# Patient Record
Sex: FEMALE | Race: BLACK OR AFRICAN AMERICAN | NOT HISPANIC OR LATINO | Employment: UNEMPLOYED | URBAN - METROPOLITAN AREA
[De-identification: names, ages, dates, MRNs, and addresses within clinical notes are randomized per-mention and may not be internally consistent; named-entity substitution may affect disease eponyms.]

---

## 2021-11-23 ENCOUNTER — HOSPITAL ENCOUNTER (EMERGENCY)
Facility: HOSPITAL | Age: 21
Discharge: HOME | End: 2021-11-23
Admitting: EMERGENCY MEDICINE
Payer: COMMERCIAL

## 2021-11-23 VITALS
DIASTOLIC BLOOD PRESSURE: 83 MMHG | HEIGHT: 66 IN | HEART RATE: 84 BPM | TEMPERATURE: 97.8 F | RESPIRATION RATE: 16 BRPM | SYSTOLIC BLOOD PRESSURE: 144 MMHG | OXYGEN SATURATION: 98 % | BODY MASS INDEX: 32.14 KG/M2 | WEIGHT: 200 LBS

## 2021-11-23 DIAGNOSIS — J06.9 VIRAL URI: ICD-10-CM

## 2021-11-23 DIAGNOSIS — R10.84 ABDOMINAL PAIN, GENERALIZED: ICD-10-CM

## 2021-11-23 LAB
FLUAV RNA SPEC QL NAA+PROBE: NEGATIVE
FLUBV RNA RESP QL NAA+PROBE: NEGATIVE
MONOCYTES NFR BLD AUTO: NEGATIVE %
SARS-COV-2 RNA RESP QL NAA+PROBE: NEGATIVE

## 2021-11-23 PROCEDURE — C9803 HOPD COVID-19 SPEC COLLECT: HCPCS

## 2021-11-23 PROCEDURE — 87636 SARSCOV2 & INF A&B AMP PRB: CPT | Performed by: EMERGENCY MEDICINE

## 2021-11-23 PROCEDURE — 99283 EMERGENCY DEPT VISIT LOW MDM: CPT

## 2021-11-23 PROCEDURE — 86308 HETEROPHILE ANTIBODY SCREEN: CPT | Performed by: EMERGENCY MEDICINE

## 2021-11-23 PROCEDURE — 36415 COLL VENOUS BLD VENIPUNCTURE: CPT | Performed by: EMERGENCY MEDICINE

## 2021-11-23 PROCEDURE — 250N000013 HC RX MED GY IP 250 OP 250 PS 637: Performed by: EMERGENCY MEDICINE

## 2021-11-23 RX ORDER — SUCRALFATE ORAL 1 G/10ML
1 SUSPENSION ORAL 4 TIMES DAILY
Qty: 420 ML | Refills: 0 | Status: SHIPPED | OUTPATIENT
Start: 2021-11-23

## 2021-11-23 RX ORDER — ACETAMINOPHEN 325 MG/1
975 TABLET ORAL ONCE
Status: COMPLETED | OUTPATIENT
Start: 2021-11-23 | End: 2021-11-23

## 2021-11-23 RX ADMIN — ACETAMINOPHEN 975 MG: 325 TABLET ORAL at 21:45

## 2021-11-23 ASSESSMENT — MIFFLIN-ST. JEOR: SCORE: 1678.94

## 2021-11-23 ASSESSMENT — ENCOUNTER SYMPTOMS
ABDOMINAL PAIN: 1
COUGH: 1
SORE THROAT: 1
DIARRHEA: 0
HEMATURIA: 0
VOMITING: 0
CHILLS: 1
NECK PAIN: 1
HEADACHES: 1
SHORTNESS OF BREATH: 0
NAUSEA: 0
FEVER: 0
DYSURIA: 0

## 2021-11-24 NOTE — ED NOTES
Pt reports ongoing sore throat mild abdominal discomfort around the umbilicus, and bilateral ear pain x5 days. Pt also reports increased fatigue and sleeping as well as decreased appetite. Denies fevers, endorses chills, denies NVD, Denies LOC, Denies SOB, Denies cough, denies CP. No other complaints.

## 2021-11-24 NOTE — DISCHARGE INSTRUCTIONS
You were seen in the emergency department today for evaluation of multiple symptoms.  Your Covid and mono screen were both negative.    Take Tylenol 1000 mg every 6-8 hours for pain.  Try taking a decongestant like Sudafed to help with your ear discomfort.  Continue taking your pantoprazole as prescribed. I will prescribe you a medicine called carafate you can take 4x daily before meals to help with the stomach pain.     Follow-up with your primary care provider as soon as possible.  Return to the ER with any new or worsening symptoms including severe pain, difficulty breathing, persistent vomiting, chest pain, or any other symptoms that concern you.

## 2021-11-24 NOTE — ED PROVIDER NOTES
EMERGENCY DEPARTMENT ENCOUNTER      NAME: Servando Masterson  AGE: 21 year old female  YOB: 2000  MRN: [id number]  EVALUATION DATE & TIME: 11/23/2021  8:53 PM    PCP: No Ref-Primary, Physician    ED PROVIDER: Roz Santillan PA-C      No chief complaint on file.        FINAL IMPRESSION:  1. Viral URI    2. Abdominal pain, generalized          ED COURSE & MEDICAL DECISION MAKING:    Pertinent Labs & Imaging studies reviewed. (See chart for details)    21 year old female presents to the Emergency Department for evaluation of multiple complaints.    Physical exam is remarkable for a generally well-appearing female who is in no acute distress.  Heart and lung sounds are clear diffusely throughout.  Abdomen soft and nontender.  TMs unremarkable bilaterally, oropharynx unremarkable.  No significant lymphadenopathy noted on exam.  No meningismus or nuchal rigidity.  Vital signs are stable and she is afebrile.    Covid/influenza test negative.  Mono screen is negative.    Patient was given Tylenol for treatment of her pain.  I discussed further evaluation for abdominal pain with her including screening labs, she declined this and would prefer to follow-up with her primary care provider which I think is reasonable given her benign abdominal exam.  I do not see any evidence of otitis media or strep pharyngitis on my exam, I suspect her upper respiratory symptoms are secondary to a viral URI.  Recommend comfort care at home with Tylenol for pain, also recommend over-the-counter decongestant.  Patient states that her stomach pain feels similar to her ulcer related pain in the past so I provided her with a prescription for Carafate.  Advised follow-up with her primary care provider as soon as possible, recommend return to the ED with any new or worsening symptoms.  The patient is agreeable with this treatment plan and verbalized her understanding.    ED Course   9:30 PM Performed my initial history and physical  exam. Discussed workup in the emergency department, management of symptoms, and likely disposition.   10:15 PM Updated patient with test results. I discussed the plan for discharge with the patient, and patient is agreeable. We discussed supportive cares at home and reasons for return to the ER including new or worsening symptoms - all questions and concerns addressed. Patient to be discharged by RN.    At the conclusion of the encounter I discussed the results of all of the tests and the disposition. The questions were answered. The patient or family acknowledged understanding and was agreeable with the care plan.     Voice recognition software was used in the creation of this note. Any grammatical or nonsensical errors are due to inherent errors with the software and are not the intention of the writer.     MEDICATIONS GIVEN IN THE EMERGENCY:  Medications   acetaminophen (TYLENOL) tablet 975 mg (975 mg Oral Given 11/23/21 2145)       NEW PRESCRIPTIONS STARTED AT TODAY'S ER VISIT  New Prescriptions    SUCRALFATE (CARAFATE) 1 GM/10ML SUSPENSION    Take 10 mLs (1 g) by mouth 4 times daily            =================================================================    HPI    Patient information was obtained from: Patient    Use of Intrepreter: N/A         Servando Masterson is a 21 year old female who presents to the emergency department for evaluation of multiple complaints.    The patient reports that for the last week, she has been experiencing a headache, anterior and lateral neck pain bilaterally, and a sore throat.  She states while she was here in the emergency department, she developed abdominal pain as well which is sharp and intermittent in nature.  She endorses chills but has not measured a fever.  She has been taking Tylenol without improvement.  She also endorses a mild cough and nasal congestion.  No change in taste or smell.  No known COVID-19 exposures, she is not vaccinated.  She smokes 1 pack/day of  cigarettes.  She denies any nausea, vomiting, diarrhea, black or bloody stools, chest pain, or difficulty breathing.      REVIEW OF SYSTEMS   Review of Systems   Constitutional: Positive for chills. Negative for fever.   HENT: Positive for congestion and sore throat.    Respiratory: Positive for cough. Negative for shortness of breath.    Cardiovascular: Negative for chest pain.   Gastrointestinal: Positive for abdominal pain. Negative for diarrhea, nausea and vomiting.   Genitourinary: Negative for dysuria and hematuria.   Musculoskeletal: Positive for neck pain.   Neurological: Positive for headaches.     All other systems reviewed and are negative unless noted in HPI.    PAST MEDICAL HISTORY:  No past medical history on file.    PAST SURGICAL HISTORY:  Past Surgical History:   Procedure Laterality Date     ENT SURGERY      tonsils and adenoids removed       CURRENT MEDICATIONS:    sucralfate (CARAFATE) 1 GM/10ML suspension  clindamycin (CLINDAGEL) 1 % gel  Fexofenadine HCl (ALLEGRA PO)  Ibuprofen (MIDOL CRAMP FORMULA MAX ST PO)  naproxen (NAPROSYN) 500 MG tablet        ALLERGIES:  Allergies   Allergen Reactions     Pcn [Penicillins]      Sulfa Drugs        FAMILY HISTORY:  No family history on file.    SOCIAL HISTORY:   Social History     Socioeconomic History     Marital status: Single     Spouse name: Not on file     Number of children: Not on file     Years of education: Not on file     Highest education level: Not on file   Occupational History     Not on file   Tobacco Use     Smoking status: Current Every Day Smoker     Packs/day: 0.25     Smokeless tobacco: Never Used   Substance and Sexual Activity     Alcohol use: No     Drug use: No     Sexual activity: Yes     Partners: Female   Other Topics Concern     Not on file   Social History Narrative     Not on file     Social Determinants of Health     Financial Resource Strain: Not on file   Food Insecurity: Not on file   Transportation Needs: Not on file  "  Physical Activity: Not on file   Stress: Not on file   Social Connections: Not on file   Intimate Partner Violence: Not on file   Housing Stability: Not on file       VITALS:  Patient Vitals for the past 24 hrs:   BP Temp Pulse Resp SpO2 Height Weight   11/23/21 1829 (!) 144/83 97.8  F (36.6  C) 84 16 98 % 1.676 m (5' 6\") 90.7 kg (200 lb)       PHYSICAL EXAM    VITAL SIGNS: BP (!) 144/83   Pulse 84   Temp 97.8  F (36.6  C)   Resp 16   Ht 1.676 m (5' 6\")   Wt 90.7 kg (200 lb)   SpO2 98%   BMI 32.28 kg/m    General Appearance: Alert, cooperative, normal speech and facial symmetry, appears stated age, the patient does not appear in distress  Head:  Normocephalic, without obvious abnormality, atraumatic  Eyes: Conjunctiva/corneas clear, EOM's intact, no nystagmus, PERRL  ENT:  TMs unremarkable bilaterally with no erythema or effusion; Lips, mucosa, and tongue normal; teeth and gums normal, no pharyngeal inflammation, no dysphonia or difficulty swallowing, membranes are moist without pallor  Neck:  Supple, symmetrical, trachea midline, no adenopathy; no midline tenderness or stepoffs; no nuchal rigidity  Cardio:  Regular rate and rhythm, S1 and S2 normal, no murmur, rub    or gallop, 2+ pulses symmetric in all extremities  Pulm:  Clear to auscultation bilaterally, respirations unlabored with no accessory muscle use  Abdomen:  Abdomen is soft, non-distended with no tenderness to palpation, rebound tenderness, or guarding.   Extremities:  Moves all extremities  Neuro: Patient is awake, alert, and responsive to voice. No gross motor weaknesses or sensory loss; moves all extremities.     LAB:  All pertinent labs reviewed and interpreted.  Labs Ordered and Resulted from Time of ED Arrival to Time of ED Departure   INFLUENZA A/B & SARS-COV2 PCR MULTIPLEX - Normal       Result Value    Influenza A PCR Negative      Influenza B PCR Negative      SARS CoV2 PCR Negative     MONONUCLEOSIS SCREEN - Normal    Mononucleosis " Screen Negative         RADIOLOGY:  Reviewed all pertinent imaging. Please see official radiology report.  No orders to display       Roz Santillan PA-C  Emergency Medicine  [facility] EMERGENCY DEPARTMENT  [facility] [facility] [address] [phone]  Dept: [phone]       Roz Santillan PA-C  11/23/21 6424

## 2023-12-28 ENCOUNTER — APPOINTMENT (OUTPATIENT)
Dept: RADIOLOGY | Facility: HOSPITAL | Age: 23
End: 2023-12-28
Attending: EMERGENCY MEDICINE

## 2023-12-28 ENCOUNTER — HOSPITAL ENCOUNTER (EMERGENCY)
Facility: HOSPITAL | Age: 23
Discharge: HOME | End: 2023-12-28
Attending: EMERGENCY MEDICINE | Admitting: EMERGENCY MEDICINE

## 2023-12-28 VITALS
SYSTOLIC BLOOD PRESSURE: 116 MMHG | HEART RATE: 82 BPM | DIASTOLIC BLOOD PRESSURE: 71 MMHG | OXYGEN SATURATION: 100 % | TEMPERATURE: 98.7 F | RESPIRATION RATE: 18 BRPM | WEIGHT: 200 LBS | BODY MASS INDEX: 32.14 KG/M2 | HEIGHT: 66 IN

## 2023-12-28 DIAGNOSIS — H92.03 OTALGIA, BILATERAL: ICD-10-CM

## 2023-12-28 DIAGNOSIS — J06.9 UPPER RESPIRATORY TRACT INFECTION, UNSPECIFIED TYPE: ICD-10-CM

## 2023-12-28 LAB
FLUAV RNA SPEC QL NAA+PROBE: NEGATIVE
FLUBV RNA RESP QL NAA+PROBE: NEGATIVE
RSV RNA SPEC NAA+PROBE: NEGATIVE
SARS-COV-2 RNA RESP QL NAA+PROBE: NEGATIVE

## 2023-12-28 PROCEDURE — 87637 SARSCOV2&INF A&B&RSV AMP PRB: CPT | Performed by: EMERGENCY MEDICINE

## 2023-12-28 PROCEDURE — 71046 X-RAY EXAM CHEST 2 VIEWS: CPT

## 2023-12-28 PROCEDURE — 99284 EMERGENCY DEPT VISIT MOD MDM: CPT | Mod: 25

## 2023-12-28 RX ORDER — ALBUTEROL SULFATE 90 UG/1
2 AEROSOL, METERED RESPIRATORY (INHALATION) EVERY 6 HOURS PRN
Qty: 18 G | Refills: 0 | Status: SHIPPED | OUTPATIENT
Start: 2023-12-28

## 2023-12-28 NOTE — ED TRIAGE NOTES
Pt has had r ear pain x2 weeks.     Also took  covid test and it was positive, wants to be tested.

## 2023-12-29 NOTE — ED PROVIDER NOTES
EMERGENCY DEPARTMENT ENCOUNTER      NAME: Servando Masterson  AGE: 23 year old female  YOB: 2000  MRN: [id number]  EVALUATION DATE & TIME: No admission date for patient encounter.    PCP: Shayna Silva    ED PROVIDER: Kojo Cabrera D.O.      Chief Complaint   Patient presents with    Covid Concern    Otalgia       FINAL IMPRESSION:  1. Upper respiratory tract infection, unspecified type    2. Otalgia, bilateral        ED COURSE & MEDICAL DECISION MAKIN:44 PM I met with the patient to gather history and to perform my initial exam. I discussed the plan for care while in the Emergency Department.  9:32 PM I updated the patient on the imaging results. We discussed plan for discharge and all questions were answered.         Pertinent Labs & Imaging studies reviewed. (See chart for details)  23 year old female presents to the Emergency Department for evaluation of cough and shortness of breath.  Patient reports symptoms have been going on for the last 7 days.  Initial differential did include COVID-19, influenza, RSV, pneumonia.  She did complain of bilateral ear pain, however there is no evidence of erythema, and no clinical evidence of be suggestive of otitis media, otitis externa, likely otitis externa, mastoiditis, or other emergent process.  Do believe that is likely significant otalgia secondary to congestion resulting in bulging of the TMs bilaterally but does not require antibiotics.  She did do a home COVID test 7 days ago that was positive, but she reported that it was an  test and she was uncertain whether it was accurate, we did discover this after the patient had already had a COVID test drawn here.  Or COVID test is negative for COVID, influenza, and RSV, and her chest x-ray does not show any evidence of pneumonia.  Is possible that this is all secondary to COVID, and now she is testing negative.  I did discuss with her that I did believe this to be viral in etiology, and instructed  her on proper masking while she is in public as she would be likely outside of the necessary quarantine period based on timeframe of her symptoms.  She verbalized understand agreement the plan.  Return precautions were discussed.    Medical Decision Making    History:  Supplemental history from: Documented in chart, if applicable  External Record(s) reviewed: Documented in chart, if applicable.    Work Up:  Chart documentation includes differential considered and any EKGs or imaging independently interpreted by provider, where specified.  In additional to work up documented, I considered the following work up: Documented in chart, if applicable.    External consultation:  Discussion of management with another provider: Documented in chart, if applicable    Complicating factors:  Care impacted by chronic illness: Mental Health  Care affected by social determinants of health: N/A    Disposition considerations: Discharge. I prescribed additional prescription strength medication(s) as charted. See documentation for any additional details.        At the conclusion of the encounter I discussed the results of all of the tests and the disposition. The questions were answered. The patient or family acknowledged understanding and was agreeable with the care plan.        HPI    Patient information was obtained from: patient     Use of : N/A      Servando Masterson is a 23 year old female who presents via walk-in for evaluation of covid concern and otalgia.    For the past 7 days, the patient has been experiencing cold symptoms, with shortness of breath, pharyngitis, and cough. She also has a headache at her temples. Today, she took an  covid test and got a positive result. She is not sure if the test is reliable. For the past week, the patient also has been having right ear pain.    The patient has a history of asthma, but has no inhaler at home. The patient denies fever. No other complaints noted at this time.      The patient smokes. They do not drink.    REVIEW OF SYSTEMS  Constitutional:  Denies fever, chills, weight loss or weakness  Eyes:  No pain, discharge, redness  HENT:  Denies  congestion. Positive for sore throat, otalgia.   Respiratory: No wheeze. Positive for cough, shortness of breath.   Cardiovascular:  No CP, palpitations  GI:  Denies abdominal pain, nausea, vomiting, diarrhea  : Denies dysuria, hematuria  Musculoskeletal:  Denies any new muscle/joint pain, swelling or loss of function.  Skin:  Denies rash, pallor  Neurologic:  Denies focal weakness or sensory changes. Positive for headache.  Lymph: Denies swollen nodes    All other systems negative unless noted in HPI.    PAST MEDICAL HISTORY:  History reviewed. No pertinent past medical history.    PAST SURGICAL HISTORY:  Past Surgical History:   Procedure Laterality Date    ENT SURGERY      tonsils and adenoids removed         CURRENT MEDICATIONS:    No current facility-administered medications for this encounter.     Current Outpatient Medications   Medication    albuterol (PROAIR HFA/PROVENTIL HFA/VENTOLIN HFA) 108 (90 Base) MCG/ACT inhaler    clindamycin (CLINDAGEL) 1 % gel    Fexofenadine HCl (ALLEGRA PO)    Ibuprofen (MIDOL CRAMP FORMULA MAX ST PO)    naproxen (NAPROSYN) 500 MG tablet    sucralfate (CARAFATE) 1 GM/10ML suspension         ALLERGIES:  Allergies   Allergen Reactions    Pcn [Penicillins]     Sulfa Antibiotics        FAMILY HISTORY:  History reviewed. No pertinent family history.    SOCIAL HISTORY:  Social History     Socioeconomic History    Marital status: Single   Tobacco Use    Smoking status: Every Day     Packs/day: .25     Types: Cigarettes    Smokeless tobacco: Never   Substance and Sexual Activity    Alcohol use: No    Drug use: No    Sexual activity: Yes     Partners: Female       VITALS:  Patient Vitals for the past 24 hrs:   BP Temp Pulse Resp SpO2 Height Weight   12/28/23 1757 116/71 98.7  F (37.1  C) 82 18 100 % -- --  "  12/28/23 1755 -- -- -- -- -- 1.676 m (5' 6\") 90.7 kg (200 lb)       PHYSICAL EXAM    VITAL SIGNS: /71   Pulse 82   Temp 98.7  F (37.1  C)   Resp 18   Ht 1.676 m (5' 6\")   Wt 90.7 kg (200 lb)   LMP 11/18/2023   SpO2 100%   BMI 32.28 kg/m      General Appearance: Well-appearing, well-nourished, no acute distress   Head:  Normocephalic, without obvious abnormality, atraumatic  Eyes:  PERRL, conjunctiva/corneas clear, EOM's intact,  ENT:  Lips, mucosa, and tongue normal, membranes are moist without pallor.  Bilateral bulging tympanic membranes, no erythema, no purulence, no external canal erythema. No mastoid tenderness bilaterally.  Neck:  Normal ROM, symmetrical, trachea midline    Cardio:  Regular rate and rhythm, no murmur, rub or gallop, 2+ pulses symmetric in all extremities  Pulm:  Clear to auscultation bilaterally, respirations unlabored,  Musculoskeletal: Full ROM, no edema, no cyanosis, good ROM of major joints  Integument:  Warm, Dry, No erythema, No rash.    Neurologic:  Alert & oriented.  No focal deficits appreciated.  Ambulatory.  Psychiatric:  Affect normal, Judgment normal, Mood normal.      LABS  Results for orders placed or performed during the hospital encounter of 12/28/23 (from the past 24 hour(s))   Symptomatic Influenza A/B, RSV, & SARS-CoV2 PCR (COVID-19) Nasopharyngeal    Specimen: Nasopharyngeal; Swab   Result Value Ref Range    Influenza A PCR Negative Negative    Influenza B PCR Negative Negative    RSV PCR Negative Negative    SARS CoV2 PCR Negative Negative    Narrative    Testing was performed using the Xpert Xpress CoV2/Flu/RSV Assay on the [employer] GeneXpert Instrument. This test should be ordered for the detection of SARS-CoV-2, influenza, and RSV viruses in individuals who meet clinical and/or epidemiological criteria. Test performance is unknown in asymptomatic patients. This test is for in vitro diagnostic use under the FDA EUA for laboratories certified under CLIA to " perform high or moderate complexity testing. This test has not been FDA cleared or approved. A negative result does not rule out the presence of PCR inhibitors in the specimen or target RNA in concentration below the limit of detection for the assay. If only one viral target is positive but coinfection with multiple targets is suspected, the sample should be re-tested with another FDA cleared, approved, or authorized test, if coinfection would change clinical management. This test was validated by the [facility]. These laboratories are certified under the Clinical Laboratory Improvement Amendments of 1988 (CLIA-88) as qualified to perform high complexity laboratory testing.   Chest XR,  PA & LAT    Narrative    EXAM: XR CHEST 2 VIEWS  LOCATION: [facility]  DATE: 12/28/2023    INDICATION: Cough.  COMPARISON: 04/09/2023.      Impression    IMPRESSION: No focal airspace consolidation. No pleural effusion or pneumothorax.    Cardiomediastinal silhouette is normal.         RADIOLOGY  Chest XR,  PA & LAT   Final Result   IMPRESSION: No focal airspace consolidation. No pleural effusion or pneumothorax.      Cardiomediastinal silhouette is normal.               MEDICATIONS GIVEN IN THE EMERGENCY:  Medications - No data to display    NEW PRESCRIPTIONS STARTED AT TODAY'S ER VISIT  Discharge Medication List as of 12/28/2023  9:29 PM        START taking these medications    Details   albuterol (PROAIR HFA/PROVENTIL HFA/VENTOLIN HFA) 108 (90 Base) MCG/ACT inhaler Inhale 2 puffs into the lungs every 6 hours as needed for shortness of breath, wheezing or cough, Disp-18 g, R-0, [facility] may dispense brand covered by insurance (Proair, or proventil or ventolin or generic albuterol inhaler)              I, Kali Peña, am serving as a scribe to document services personally performed by Kojo Cabrera D.O., based on my observations and the provider's statements to me.  Kojo BOLAND  ANTHONY Cabrera, attest that Kali Peña is acting in a scribe capacity, has observed my performance of the services and has documented them in accordance with my direction.     Kojo Cabrera D.O.  Emergency Medicine  [facility] EMERGENCY DEPARTMENT  [address] [place] [id number]  [phone]  Dept: [phone]       Kojo Cabrera, DO  12/28/23 9412

## 2024-02-26 ENCOUNTER — HOSPITAL ENCOUNTER (EMERGENCY)
Facility: HOSPITAL | Age: 24
Discharge: HOME | End: 2024-02-26
Attending: STUDENT IN AN ORGANIZED HEALTH CARE EDUCATION/TRAINING PROGRAM | Admitting: STUDENT IN AN ORGANIZED HEALTH CARE EDUCATION/TRAINING PROGRAM

## 2024-02-26 ENCOUNTER — APPOINTMENT (OUTPATIENT)
Dept: RADIOLOGY | Facility: HOSPITAL | Age: 24
End: 2024-02-26
Attending: STUDENT IN AN ORGANIZED HEALTH CARE EDUCATION/TRAINING PROGRAM

## 2024-02-26 VITALS
WEIGHT: 215 LBS | SYSTOLIC BLOOD PRESSURE: 125 MMHG | DIASTOLIC BLOOD PRESSURE: 86 MMHG | TEMPERATURE: 98.2 F | HEIGHT: 66 IN | BODY MASS INDEX: 34.55 KG/M2 | HEART RATE: 74 BPM | RESPIRATION RATE: 16 BRPM | OXYGEN SATURATION: 100 %

## 2024-02-26 DIAGNOSIS — R11.2 NAUSEA AND VOMITING, UNSPECIFIED VOMITING TYPE: ICD-10-CM

## 2024-02-26 DIAGNOSIS — R07.9 CHEST PAIN, UNSPECIFIED TYPE: ICD-10-CM

## 2024-02-26 LAB
ALBUMIN SERPL BCG-MCNC: 4.3 G/DL (ref 3.5–5.2)
ALP SERPL-CCNC: 86 U/L (ref 40–150)
ALT SERPL W P-5'-P-CCNC: 9 U/L (ref 0–50)
ANION GAP SERPL CALCULATED.3IONS-SCNC: 12 MMOL/L (ref 7–15)
AST SERPL W P-5'-P-CCNC: 18 U/L (ref 0–45)
BILIRUB DIRECT SERPL-MCNC: <0.2 MG/DL (ref 0–0.3)
BILIRUB SERPL-MCNC: 0.2 MG/DL
BUN SERPL-MCNC: 8.9 MG/DL (ref 6–20)
CALCIUM SERPL-MCNC: 8.8 MG/DL (ref 8.6–10)
CHLORIDE SERPL-SCNC: 104 MMOL/L (ref 98–107)
CREAT SERPL-MCNC: 0.73 MG/DL (ref 0.51–0.95)
DEPRECATED HCO3 PLAS-SCNC: 21 MMOL/L (ref 22–29)
EGFRCR SERPLBLD CKD-EPI 2021: >90 ML/MIN/1.73M2
ERYTHROCYTE [DISTWIDTH] IN BLOOD BY AUTOMATED COUNT: 15.7 % (ref 10–15)
GLUCOSE SERPL-MCNC: 93 MG/DL (ref 70–99)
HCT VFR BLD AUTO: 32.3 % (ref 35–47)
HGB BLD-MCNC: 9.8 G/DL (ref 11.7–15.7)
HOLD SPECIMEN: NORMAL
LIPASE SERPL-CCNC: 19 U/L (ref 13–60)
MCH RBC QN AUTO: 22.6 PG (ref 26.5–33)
MCHC RBC AUTO-ENTMCNC: 30.3 G/DL (ref 31.5–36.5)
MCV RBC AUTO: 75 FL (ref 78–100)
PLATELET # BLD AUTO: 367 10E3/UL (ref 150–450)
POTASSIUM SERPL-SCNC: 4.1 MMOL/L (ref 3.4–5.3)
PROT SERPL-MCNC: 7.4 G/DL (ref 6.4–8.3)
RBC # BLD AUTO: 4.33 10E6/UL (ref 3.8–5.2)
SODIUM SERPL-SCNC: 137 MMOL/L (ref 135–145)
WBC # BLD AUTO: 10.7 10E3/UL (ref 4–11)

## 2024-02-26 PROCEDURE — 71046 X-RAY EXAM CHEST 2 VIEWS: CPT

## 2024-02-26 PROCEDURE — 80076 HEPATIC FUNCTION PANEL: CPT | Performed by: STUDENT IN AN ORGANIZED HEALTH CARE EDUCATION/TRAINING PROGRAM

## 2024-02-26 PROCEDURE — C9113 INJ PANTOPRAZOLE SODIUM, VIA: HCPCS | Performed by: STUDENT IN AN ORGANIZED HEALTH CARE EDUCATION/TRAINING PROGRAM

## 2024-02-26 PROCEDURE — 85027 COMPLETE CBC AUTOMATED: CPT | Performed by: STUDENT IN AN ORGANIZED HEALTH CARE EDUCATION/TRAINING PROGRAM

## 2024-02-26 PROCEDURE — 250N000011 HC RX IP 250 OP 636: Performed by: STUDENT IN AN ORGANIZED HEALTH CARE EDUCATION/TRAINING PROGRAM

## 2024-02-26 PROCEDURE — 99285 EMERGENCY DEPT VISIT HI MDM: CPT | Mod: 25

## 2024-02-26 PROCEDURE — 36415 COLL VENOUS BLD VENIPUNCTURE: CPT | Performed by: STUDENT IN AN ORGANIZED HEALTH CARE EDUCATION/TRAINING PROGRAM

## 2024-02-26 PROCEDURE — 83690 ASSAY OF LIPASE: CPT | Performed by: STUDENT IN AN ORGANIZED HEALTH CARE EDUCATION/TRAINING PROGRAM

## 2024-02-26 PROCEDURE — 93005 ELECTROCARDIOGRAM TRACING: CPT | Performed by: STUDENT IN AN ORGANIZED HEALTH CARE EDUCATION/TRAINING PROGRAM

## 2024-02-26 PROCEDURE — 96374 THER/PROPH/DIAG INJ IV PUSH: CPT

## 2024-02-26 RX ADMIN — PANTOPRAZOLE SODIUM 40 MG: 40 INJECTION, POWDER, FOR SOLUTION INTRAVENOUS at 21:36

## 2024-02-26 ASSESSMENT — COLUMBIA-SUICIDE SEVERITY RATING SCALE - C-SSRS
2. HAVE YOU ACTUALLY HAD ANY THOUGHTS OF KILLING YOURSELF IN THE PAST MONTH?: NO
1. IN THE PAST MONTH, HAVE YOU WISHED YOU WERE DEAD OR WISHED YOU COULD GO TO SLEEP AND NOT WAKE UP?: NO
6. HAVE YOU EVER DONE ANYTHING, STARTED TO DO ANYTHING, OR PREPARED TO DO ANYTHING TO END YOUR LIFE?: NO

## 2024-02-26 ASSESSMENT — ACTIVITIES OF DAILY LIVING (ADL)
ADLS_ACUITY_SCORE: 35

## 2024-02-27 NOTE — ED PROVIDER NOTES
EMERGENCY DEPARTMENT ENCOUNTER       ED Course & Medical Decision Making     8:40 PM I met with the patient to gather history and to perform my initial exam. We discussed plans for the ED course, including diagnostic testing and treatment.    10:30 PM Rechecked and updated patient. I discussed plans for discharge with the patient, which they were agreeable to. We discussed supportive cares at home and reasons for return to the ER including new or worsening symptoms. All questions and concerns were addressed. Patient to be discharged by RN.     Final Impression  23 year old female presents for evaluation of chest pain since yesterday, had 3 episodes of vomiting last night, then of vomiting today.  Was recently on azithromycin, symptoms started after her fourth dose of azithromycin has not taken a dose today due to concerns of possible intolerance.  Denies ever having issues with azithromycin in the past.  Still having some chest pain.  States this does not feel like acid reflux to her.  Physical exam fairly benign.  Heart and lungs clear to auscultation bilaterally.  Chest x-ray did not show any pneumothorax or signs of free air that would be indicative of esophageal perforation.  Symptoms improving after IV Protonix.  Labs reassuring.  Will discharge home with prescription for omeprazole for suspected GERD.    Prior to making a final disposition on this patient the results of patient's tests and other diagnostic studies were discussed with the patient. All questions were answered. Patient expressed understanding of the plan and was amenable to it.    Medical Decision Making    History:  Supplemental history from: NA  External Record(s) reviewed as documented below;  2/19/2024 [facility] emergency room visits, seen for sinusitis, thrush, discharged with azithromycin and nystatin swish and swallow.  Thrush thought to be due to her inhaler use.  Chart review documents for only being on albuterol.    Work Up:  Chart  documentation includes differential considered and any EKGs or imaging independently interpreted by provider, where specified.  DDx considered but not limited to: GERD, esophageal rupture, medication intolerance, viral gastroenteritis    Complicating factors:  Care impacted by chronic illness: Asthma, PTSD, JYOTI, depression  Care affected by social determinants of health: Access to Medical Care    Disposition considerations: Discharge. I prescribed additional prescription strength medication(s) as charted. See documentation for any additional details.      Medications   pantoprazole (PROTONIX) IV push injection 40 mg (40 mg Intravenous $Given 2/26/24 2136)       New Prescriptions    OMEPRAZOLE (PRILOSEC) 20 MG DR CAPSULE    Take 1 capsule (20 mg) by mouth daily for 30 days       Final Impression     1. Nausea and vomiting, unspecified vomiting type    2. Chest pain, unspecified type      Chief Complaint     Chief Complaint   Patient presents with    Chest Pain    Nausea & Vomiting     Patient arrives from home. C/o 8/10 constant midsternal/epigastric pain that started last night. Pain sometimes shoots to left rib area. Patient reports she is on zpac and tried tylenol PM and then noticed the pain. Also c/o nausea. Last vomited last night. [facility] and [facility].     HPI     Servando Masterson is a 23 year old female with a pertinent medical history of asthma and JYOTI who presents for evaluation of chest pain.     Patient reports having constant chest pain since yesterday. She also endorses having 3 episodes of vomiting last night. She denies any vomiting today, but she does endorse having constant nausea today. She notes that yesterday she took her day four dosage of Zithromax of her five day regimen that she was prescribed for a sinus infection, but she mentions that she did not take her day five dosage today due to her developing the chest pain. She endorses taking Zithromax  "in the past and tolerating it without any complications. She endorses a PMH of acid reflux, but she denies her current chest pain being similar. She states her LMP was last month and she denies any possibility of pregnancy. She denies any other complications at this time.     I, Marcus Mnotano am serving as a scribe to document services personally performed by Dr. Luis Eduardo Monroy MD, based on my observation and the provider's statements to me. I, Dr. Luis Eduardo Monroy MD attest that Marcus Charmaine is acting in a scribe capacity, has observed my performance of the services and has documented them in accordance with my direction.    Physical Exam     /63   Pulse 90   Temp 98.2  F (36.8  C) (Oral)   Resp 16   Ht 1.676 m (5' 6\")   Wt 97.5 kg (215 lb)   LMP 11/18/2023   SpO2 98%   BMI 34.70 kg/m    Constitutional: Awake, alert, in no acute distress.  Head: Normocephalic, atraumatic.  ENT: Mucous membranes moist.  Eyes: Conjunctiva normal.  Respiratory: Respirations even, unlabored, in no acute respiratory distress.  Lungs clear to auscultation bilaterally, no coarseness or wheezing.  Cardiovascular: Regular rate and rhythm. Good peripheral perfusion.  GI: Abdomen soft, non-tender.  No guarding or rebound.  Musculoskeletal: Moves all 4 extremities equally.  Integument: Warm, dry.  No crepitus of the chest wall or along the neck.  Neurologic: Alert & oriented x 3. Normal speech. Grossly normal motor and sensory function. No focal deficits noted.  Psychiatric: Normal mood    Labs & Imaging     Imaging reviewed and independently interpreted as below;   CXR images reviewed, no pneumothorax, no free air or signs of esophageal perforation.    Results for orders placed or performed during the hospital encounter of 02/26/24   Chest XR,  PA & LAT    Impression    IMPRESSION: Negative chest.   CBC (+ platelets, no diff)   Result Value Ref Range    WBC Count 10.7 4.0 - 11.0 10e3/uL    RBC Count 4.33 3.80 - 5.20 " 10e6/uL    Hemoglobin 9.8 (L) 11.7 - 15.7 g/dL    Hematocrit 32.3 (L) 35.0 - 47.0 %    MCV 75 (L) 78 - 100 fL    MCH 22.6 (L) 26.5 - 33.0 pg    MCHC 30.3 (L) 31.5 - 36.5 g/dL    RDW 15.7 (H) 10.0 - 15.0 %    Platelet Count 367 150 - 450 10e3/uL   Basic metabolic panel   Result Value Ref Range    Sodium 137 135 - 145 mmol/L    Potassium 4.1 3.4 - 5.3 mmol/L    Chloride 104 98 - 107 mmol/L    Carbon Dioxide (CO2) 21 (L) 22 - 29 mmol/L    Anion Gap 12 7 - 15 mmol/L    Urea Nitrogen 8.9 6.0 - 20.0 mg/dL    Creatinine 0.73 0.51 - 0.95 mg/dL    GFR Estimate >90 >60 mL/min/1.73m2    Calcium 8.8 8.6 - 10.0 mg/dL    Glucose 93 70 - 99 mg/dL   Hepatic function panel   Result Value Ref Range    Protein Total 7.4 6.4 - 8.3 g/dL    Albumin 4.3 3.5 - 5.2 g/dL    Bilirubin Total 0.2 <=1.2 mg/dL    Alkaline Phosphatase 86 40 - 150 U/L    AST 18 0 - 45 U/L    ALT 9 0 - 50 U/L    Bilirubin Direct <0.20 0.00 - 0.30 mg/dL   Result Value Ref Range    Lipase 19 13 - 60 U/L       EKG     EKG reviewed and independently interpreted as below;  Normal sinus rhythm, rate 73.  .  QRS 82.  QTc 401.  No STEMI.     Luis Eduardo Monroy MD  02/26/24 [id number]

## 2024-02-27 NOTE — ED TRIAGE NOTES
Patient arrives from home. C/o 8/10 constant midsternal/epigastric pain that started last night. Pain sometimes shoots to left rib area. Patient reports she is on zpac and tried tylenol PM and then noticed the pain. Also c/o nausea. Last vomited last night. [facility] and [facility] come here.      Triage Assessment (Adult)       Row Name 02/26/24 1922          Triage Assessment    Airway WDL WDL        Respiratory WDL    Respiratory WDL WDL        Cardiac WDL    Cardiac WDL X;chest pain        Chest Pain Assessment    Chest Pain Location epigastric;midsternal     Character sharp

## 2024-03-01 LAB
ATRIAL RATE - MUSE: 73 BPM
DIASTOLIC BLOOD PRESSURE - MUSE: NORMAL MMHG
INTERPRETATION ECG - MUSE: NORMAL
P AXIS - MUSE: 10 DEGREES
PR INTERVAL - MUSE: 154 MS
QRS DURATION - MUSE: 82 MS
QT - MUSE: 364 MS
QTC - MUSE: 401 MS
R AXIS - MUSE: 60 DEGREES
SYSTOLIC BLOOD PRESSURE - MUSE: NORMAL MMHG
T AXIS - MUSE: 3 DEGREES
VENTRICULAR RATE- MUSE: 73 BPM